# Patient Record
Sex: FEMALE | URBAN - METROPOLITAN AREA
[De-identification: names, ages, dates, MRNs, and addresses within clinical notes are randomized per-mention and may not be internally consistent; named-entity substitution may affect disease eponyms.]

---

## 2024-03-05 ENCOUNTER — APPOINTMENT (RX ONLY)
Dept: URBAN - METROPOLITAN AREA CLINIC 12 | Facility: CLINIC | Age: 39
Setting detail: DERMATOLOGY
End: 2024-03-05

## 2024-03-05 DIAGNOSIS — Z41.9 ENCOUNTER FOR PROCEDURE FOR PURPOSES OTHER THAN REMEDYING HEALTH STATE, UNSPECIFIED: ICD-10-CM

## 2024-03-05 PROCEDURE — ? CONSULTATION - FILLERS

## 2024-03-05 PROCEDURE — ? BOTOX

## 2024-03-05 NOTE — PROCEDURE: BOTOX
Price Per Unit In $ (Use Numbers Only, No Text Please.): 13
Additional Area 1 Location: chin
Expiration Date (Month Year): 3/26
Dilution (U/0.1 Cc): 2.2
Forehead Units: 10
Additional Area 2 Location: axillae
Consent: Written consent was obtained prior to the procedure. Risks, benefits, expectations and alternatives were discussed including, but not limited to, infection, bleeding, lid/brow ptosis, bruising, swelling, diplopia, temporary effects, incomplete chemical denervation and dissatisfaction with the cosmetic outcome. No guarantee or warranty was given or implied regarding longevity of results.
Glabellar Complex Units: 20
Additional Area 3 Location: masseter
Use Map Statement For Sites (Optional): No
Platsyma Units: 0
Reconstitution Date: 03/05/2024
Detail Level: Simple
Lot #: [id number]
Map Statment: See attached map for complete details
Administered By (Optional): Shavonne Field PA-C
Show Price In Note?: yes
Periorbital Skin Units: 15
Postcare Instructions: Patient instructed to not lie down for 4 hours and limit physical activity for 24 hours. Patient instructed not to travel by airplane for 48 hours.
Bill Summary Price Listed Below, Or Bill Total Of Units X Price Per Unit?: Bill #Units x Price Per Unit

## 2024-03-05 NOTE — PROCEDURE: CONSULTATION - FILLERS
Additional Area 2 Location: chin
Consultation Charge $ (Use Numbers Only, No Text Please.): 0
Additional Area 3 Location: perioral lines
Lips Filler (Primary): Restylane Refyne
Malar Primary Filler Volume In Cc (Estimated): 2
Additional Area 4 Location: nose
Malar Filler (Primary): Restylane Contour
Detail Level: Detailed
Additional Area 1 Filler (Primary): Juvederm Voluma XC
Additional Area 5 Location: neck
Additional Area 1 Location: chin shadow
Send Procedure Quote As Charge: No
Lips Filler (Additional): Restylyenifer Larson

## 2024-03-21 ENCOUNTER — APPOINTMENT (RX ONLY)
Dept: URBAN - METROPOLITAN AREA CLINIC 12 | Facility: CLINIC | Age: 39
Setting detail: DERMATOLOGY
End: 2024-03-21

## 2024-03-21 DIAGNOSIS — Z42.8 ENCOUNTER FOR OTHER PLASTIC AND RECONSTRUCTIVE SURGERY FOLLOWING MEDICAL PROCEDURE OR HEALED INJURY: ICD-10-CM

## 2024-03-21 PROCEDURE — ? BOTOX

## 2024-03-21 NOTE — PROCEDURE: BOTOX
Use Map Statement For Sites (Optional): No
Platsyma Units: 0
Map Statment: See attached map for complete details
Lot #: [id number]
Additional Area 3 Location: masseter
Expiration Date (Month Year): 3/26
Additional Area 1 Location: chin
Consent: Written consent was obtained prior to the procedure. Risks, benefits, expectations and alternatives were discussed including, but not limited to, infection, bleeding, lid/brow ptosis, bruising, swelling, diplopia, temporary effects, incomplete chemical denervation and dissatisfaction with the cosmetic outcome. No guarantee or warranty was given or implied regarding longevity of results.
Forehead Units: 2
Additional Area 2 Location: axillae
Dilution (U/0.1 Cc): 2.2
Bill Summary Price Listed Below, Or Bill Total Of Units X Price Per Unit?: Bill #Units x Price Per Unit
Show Price In Note?: yes
Postcare Instructions: Patient instructed to not lie down for 4 hours and limit physical activity for 24 hours. Patient instructed not to travel by airplane for 48 hours.
Periorbital Skin Units: 3
Detail Level: Simple
Administered By (Optional): Shavonne Field PA-C